# Patient Record
Sex: FEMALE | Race: WHITE | Employment: FULL TIME | URBAN - NONMETROPOLITAN AREA
[De-identification: names, ages, dates, MRNs, and addresses within clinical notes are randomized per-mention and may not be internally consistent; named-entity substitution may affect disease eponyms.]

---

## 2018-01-08 ENCOUNTER — HOSPITAL ENCOUNTER (EMERGENCY)
Age: 23
Discharge: HOME | End: 2018-01-08

## 2018-01-08 VITALS
WEIGHT: 132 LBS | HEIGHT: 64 IN | RESPIRATION RATE: 18 BRPM | SYSTOLIC BLOOD PRESSURE: 119 MMHG | HEART RATE: 103 BPM | OXYGEN SATURATION: 95 % | BODY MASS INDEX: 22.53 KG/M2 | TEMPERATURE: 98.2 F | DIASTOLIC BLOOD PRESSURE: 80 MMHG

## 2018-01-08 DIAGNOSIS — I95.1 ORTHOSTATIC SYNCOPE: ICD-10-CM

## 2018-01-08 DIAGNOSIS — J45.21 MILD INTERMITTENT ASTHMA WITH EXACERBATION: Primary | ICD-10-CM

## 2018-01-08 PROCEDURE — 99203 OFFICE O/P NEW LOW 30 MIN: CPT | Performed by: NURSE PRACTITIONER

## 2018-01-08 PROCEDURE — 99203 OFFICE O/P NEW LOW 30 MIN: CPT

## 2018-01-08 RX ORDER — CIPROFLOXACIN 500 MG/1
500 TABLET, FILM COATED ORAL 2 TIMES DAILY
Qty: 14 TABLET | Refills: 0 | Status: SHIPPED | OUTPATIENT
Start: 2018-01-08 | End: 2018-01-15

## 2018-01-08 RX ORDER — PREDNISONE 20 MG/1
20 TABLET ORAL 2 TIMES DAILY
Qty: 10 TABLET | Refills: 0 | Status: SHIPPED | OUTPATIENT
Start: 2018-01-08 | End: 2018-01-13

## 2018-01-08 RX ORDER — LEVONORGESTREL AND ETHINYL ESTRADIOL 0.15-0.03
1 KIT ORAL DAILY
COMMUNITY

## 2018-01-08 RX ORDER — MIDODRINE HYDROCHLORIDE 5 MG/1
5 TABLET ORAL 3 TIMES DAILY
COMMUNITY
End: 2018-01-08

## 2018-01-08 RX ORDER — MIDODRINE HYDROCHLORIDE 5 MG/1
5 TABLET ORAL 3 TIMES DAILY
Qty: 6 TABLET | Refills: 0 | Status: SHIPPED | OUTPATIENT
Start: 2018-01-08 | End: 2018-01-10

## 2018-01-08 RX ORDER — ALBUTEROL SULFATE 90 UG/1
2 AEROSOL, METERED RESPIRATORY (INHALATION) EVERY 6 HOURS PRN
COMMUNITY

## 2018-01-08 ASSESSMENT — ENCOUNTER SYMPTOMS
CONSTIPATION: 0
NAUSEA: 0
SORE THROAT: 0
ABDOMINAL PAIN: 0
DIARRHEA: 0
WHEEZING: 1
SHORTNESS OF BREATH: 0
COUGH: 1

## 2018-01-08 NOTE — ED PROVIDER NOTES
field, the left upper field, the left middle field and the left lower field. She has no rhonchi. She has no rales. Lymphadenopathy:     She has no cervical adenopathy. Neurological: She is alert. She displays no tremor. Coordination normal. GCS eye subscore is 4. GCS verbal subscore is 5. GCS motor subscore is 6. Skin: Skin is warm and dry. She is not diaphoretic. Psychiatric: She has a normal mood and affect. Her speech is normal and behavior is normal. Judgment and thought content normal. Cognition and memory are normal.       DIAGNOSTIC RESULTS   Labs:No results found for this visit on 01/08/18. IMAGING:    URGENT CARE COURSE:     Vitals:    01/08/18 1049   BP: 119/80   Pulse: 103   Resp: 18   Temp: 98.2 °F (36.8 °C)   SpO2: 95%   Weight: 132 lb (59.9 kg)   Height: 5' 4\" (1.626 m)     Patient has warm dry skin and nonlabored respirations. She is afebrile with a pulse ox of 95%. She has expiratory wheezing throughout all lung fields on auscultation and coughs occasionally during the exam.  TMs/posterior oropharynx/nasal turbinates are all normal and she has no cervical adenopathy. Patient will be treated for acute asthma exacerbation. She states she has been using an albuterol inhaler 3 times a day and she has enough medication left. Patient is informed that the urgent care prescribing Midodrine is not normal and generally not an appropriate place. It is somewhat high risk to prescribe medications of this type for patients that are not known to the provider. As the patient is tachycardic in the urgent care, she is given 2 days of medication, so that she can get home and follow up with her provider there. Medications - No data to display  PROCEDURES:  None  FINAL IMPRESSION      1. Mild intermittent asthma with exacerbation    2.  Orthostatic syncope        DISPOSITION/PLAN   DISPOSITION Decision To Discharge 01/08/2018 11:00:37 AM    PATIENT REFERRED TO:  Your PCP at home          DISCHARGE